# Patient Record
Sex: MALE
[De-identification: names, ages, dates, MRNs, and addresses within clinical notes are randomized per-mention and may not be internally consistent; named-entity substitution may affect disease eponyms.]

---

## 2018-05-02 ENCOUNTER — HOSPITAL ENCOUNTER (OUTPATIENT)
Dept: HOSPITAL 5 - VAS | Age: 78
Discharge: HOME | End: 2018-05-02
Attending: INTERNAL MEDICINE
Payer: MEDICARE

## 2018-05-02 DIAGNOSIS — I65.23: Primary | ICD-10-CM

## 2018-05-02 PROCEDURE — 93880 EXTRACRANIAL BILAT STUDY: CPT

## 2018-05-08 NOTE — VASCULAR LAB REPORT
CAROTID DUPLEX STUDY:



RIGHT        PSVEDV

CCA PROX:[id number]

CCA DIST:[id number]

ICA PROX:[id number]

ICA MID:[id number]

ICA DIST:[id number]

ECA:               147

VERT:                  48       9  



LEFT         PSVEDV

CCA PROX:[id number]

CCA DIST:[id number]

ICA PROX:[id number]

ICA MID:[id number]

ICA DIST:[id number]

ECA:                    240

VERT:                  39       9





REASON FOR EXAM: Left carotid bruit.

     

COMMENTS ON THE RIGHT:

Doppler frequency analysis is consistent with 16 to 49 percent diameter 

reduction of the internal carotid artery.  A small amount of plaque is 

seen.  The common carotid artery is patent. The external carotid artery 

is patent.  The vertebral artery has antegrade flow.



COMMENTS ON THE LEFT:

Doppler frequency analysis is consistent with 50 to 79 percent diameter 

reduction of the internal carotid artery.  Eccentric plaque is seen at 

the origin of the internal carotid artery.  The common carotid artery 

is patent. The external carotid artery is patent.  The vertebral artery 

has antegrade flow.



IMPRESSION:

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  

Eccentric plaque is seen.  These findings could be responsible for 

carotid bruit.

Less than 50% diameter reduction in the right internal carotid artery.  

Small amount of plaque seen.

Recommend repeat carotid duplex in 12 months.

## 2018-05-10 ENCOUNTER — HOSPITAL ENCOUNTER (OUTPATIENT)
Dept: HOSPITAL 5 - OR | Age: 78
Discharge: HOME | End: 2018-05-10
Attending: OPHTHALMOLOGY
Payer: MEDICARE

## 2018-05-10 VITALS — SYSTOLIC BLOOD PRESSURE: 139 MMHG | DIASTOLIC BLOOD PRESSURE: 52 MMHG

## 2018-05-10 DIAGNOSIS — I25.2: ICD-10-CM

## 2018-05-10 DIAGNOSIS — I25.10: ICD-10-CM

## 2018-05-10 DIAGNOSIS — E11.36: Primary | ICD-10-CM

## 2018-05-10 DIAGNOSIS — Z95.5: ICD-10-CM

## 2018-05-10 DIAGNOSIS — E78.00: ICD-10-CM

## 2018-05-10 DIAGNOSIS — H26.491: ICD-10-CM

## 2018-05-10 DIAGNOSIS — M19.90: ICD-10-CM

## 2018-05-10 DIAGNOSIS — I10: ICD-10-CM

## 2018-05-10 DIAGNOSIS — Z87.891: ICD-10-CM

## 2018-05-10 DIAGNOSIS — Z98.890: ICD-10-CM

## 2018-05-10 PROCEDURE — 82962 GLUCOSE BLOOD TEST: CPT

## 2018-07-12 ENCOUNTER — HOSPITAL ENCOUNTER (OUTPATIENT)
Dept: HOSPITAL 5 - CT | Age: 78
Discharge: HOME | End: 2018-07-12
Attending: SURGERY
Payer: MEDICARE

## 2018-07-12 DIAGNOSIS — E78.00: ICD-10-CM

## 2018-07-12 DIAGNOSIS — I65.22: Primary | ICD-10-CM

## 2018-07-12 DIAGNOSIS — Z87.891: ICD-10-CM

## 2018-07-12 DIAGNOSIS — I10: ICD-10-CM

## 2018-07-12 LAB — BUN SERPL-MCNC: 23 MG/DL (ref 9–20)

## 2018-07-12 PROCEDURE — 70498 CT ANGIOGRAPHY NECK: CPT

## 2018-07-12 PROCEDURE — 36415 COLL VENOUS BLD VENIPUNCTURE: CPT

## 2018-07-12 PROCEDURE — 82565 ASSAY OF CREATININE: CPT

## 2018-07-12 PROCEDURE — 84520 ASSAY OF UREA NITROGEN: CPT

## 2018-07-12 NOTE — CAT SCAN REPORT
CTA NECK



INDICATION: Occlusion and stenosis of left carotid artery.



COMPARISON: 5/2/2018 carotid duplex.



FINDINGS: CTA neck performed utilizing IV contrast. Axial, sagittal, 

coronal and MIP reconstructions obtained.



Patent aortic arch and major arising branch vessels, including 

bilateral carotids and codominant vertebral arteries.  Few 

atherosclerotic calcifications.  Patent common carotid arteries.  Mild 

bilateral carotid bulb atherosclerotic calcifications, left slightly 

more than right.  However, additional noncalcified thrombus results in 

approximately 70-80% left carotid bulb/proximal ICA stenosis.  Mild, 

less than 50% right carotid bulb stenosis suspected.  Medial deviation 

of the right ICA noted on axial image 87, series 2, extending toward 

the skull base.  



Normal imaged intracranial appearance.  Bilateral cataract surgery.  

Mild left maxillary sinus mucosal thickening or retention cyst 

inferiorly.  Clear remainder imaged paranasal sinuses and mastoid air 

cells.  Normal thyroid.  Clear visualized upper lungs.  Multilevel 

cervical spine degenerative spurring and facet arthropathy.  Moderate 

C5-C6 and C6-C7 disc narrowing.



CONCLUSION: Approximately 70-80% left carotid bulb/proximal ICA 

stenosis noted without significant right ICA stenosis, similar to prior 

ultrasound findings, as described.  Few other incidental findings also 

seen, as above.  Please correlate.



Thank you for the opportunity to participate in this patient's care.